# Patient Record
Sex: FEMALE | Race: BLACK OR AFRICAN AMERICAN | NOT HISPANIC OR LATINO | Employment: FULL TIME | URBAN - METROPOLITAN AREA
[De-identification: names, ages, dates, MRNs, and addresses within clinical notes are randomized per-mention and may not be internally consistent; named-entity substitution may affect disease eponyms.]

---

## 2018-05-22 ENCOUNTER — TELEPHONE (OUTPATIENT)
Dept: OBSTETRICS AND GYNECOLOGY | Facility: CLINIC | Age: 28
End: 2018-05-22

## 2018-05-22 NOTE — TELEPHONE ENCOUNTER
Spoke to patient and let her know that we are not accepting new medicaid patients and that she needs to schedule a follow up where she went to for her miscarriage. Patient verbalized understanding.

## 2018-05-22 NOTE — TELEPHONE ENCOUNTER
----- Message from Angelina Hanks sent at 5/22/2018  2:42 PM CDT -----  Contact: self  Pt is currently in the hospital.  She had a miscarriage today.    Pt was told to follow up with her OB Dr asap.    Please call pt to schedule for tomorrow if possible.  Pt can be reached at [phone]